# Patient Record
Sex: MALE | Race: WHITE | NOT HISPANIC OR LATINO | URBAN - METROPOLITAN AREA
[De-identification: names, ages, dates, MRNs, and addresses within clinical notes are randomized per-mention and may not be internally consistent; named-entity substitution may affect disease eponyms.]

---

## 2018-11-15 ENCOUNTER — APPOINTMENT (RX ONLY)
Dept: URBAN - METROPOLITAN AREA CLINIC 343 | Facility: CLINIC | Age: 83
Setting detail: DERMATOLOGY
End: 2018-11-15

## 2018-11-15 DIAGNOSIS — L82.1 OTHER SEBORRHEIC KERATOSIS: ICD-10-CM

## 2018-11-15 DIAGNOSIS — L82.0 INFLAMED SEBORRHEIC KERATOSIS: ICD-10-CM

## 2018-11-15 DIAGNOSIS — L57.0 ACTINIC KERATOSIS: ICD-10-CM

## 2018-11-15 DIAGNOSIS — L81.4 OTHER MELANIN HYPERPIGMENTATION: ICD-10-CM

## 2018-11-15 DIAGNOSIS — D18.0 HEMANGIOMA: ICD-10-CM

## 2018-11-15 DIAGNOSIS — D22 MELANOCYTIC NEVI: ICD-10-CM

## 2018-11-15 PROBLEM — M12.9 ARTHROPATHY, UNSPECIFIED: Status: ACTIVE | Noted: 2018-11-15

## 2018-11-15 PROBLEM — D22.39 MELANOCYTIC NEVI OF OTHER PARTS OF FACE: Status: ACTIVE | Noted: 2018-11-15

## 2018-11-15 PROBLEM — D22.4 MELANOCYTIC NEVI OF SCALP AND NECK: Status: ACTIVE | Noted: 2018-11-15

## 2018-11-15 PROBLEM — D18.01 HEMANGIOMA OF SKIN AND SUBCUTANEOUS TISSUE: Status: ACTIVE | Noted: 2018-11-15

## 2018-11-15 PROBLEM — D22.62 MELANOCYTIC NEVI OF LEFT UPPER LIMB, INCLUDING SHOULDER: Status: ACTIVE | Noted: 2018-11-15

## 2018-11-15 PROBLEM — I48.91 UNSPECIFIED ATRIAL FIBRILLATION: Status: ACTIVE | Noted: 2018-11-15

## 2018-11-15 PROBLEM — D22.5 MELANOCYTIC NEVI OF TRUNK: Status: ACTIVE | Noted: 2018-11-15

## 2018-11-15 PROBLEM — D22.61 MELANOCYTIC NEVI OF RIGHT UPPER LIMB, INCLUDING SHOULDER: Status: ACTIVE | Noted: 2018-11-15

## 2018-11-15 PROCEDURE — ? LIQUID NITROGEN

## 2018-11-15 PROCEDURE — 17110 DESTRUCTION B9 LES UP TO 14: CPT

## 2018-11-15 PROCEDURE — ? COUNSELING

## 2018-11-15 PROCEDURE — 17000 DESTRUCT PREMALG LESION: CPT | Mod: 59

## 2018-11-15 PROCEDURE — 99202 OFFICE O/P NEW SF 15 MIN: CPT | Mod: 25

## 2018-11-15 PROCEDURE — 17003 DESTRUCT PREMALG LES 2-14: CPT

## 2018-11-15 ASSESSMENT — LOCATION DETAILED DESCRIPTION DERM
LOCATION DETAILED: LEFT CENTRAL FRONTAL SCALP
LOCATION DETAILED: LEFT INFERIOR LATERAL FOREHEAD
LOCATION DETAILED: RIGHT PROXIMAL DORSAL FOREARM
LOCATION DETAILED: RIGHT SUPERIOR MEDIAL UPPER BACK
LOCATION DETAILED: RIGHT LATERAL MALAR CHEEK
LOCATION DETAILED: RIGHT MEDIAL UPPER BACK
LOCATION DETAILED: RIGHT MID-UPPER BACK
LOCATION DETAILED: LEFT INFERIOR FOREHEAD
LOCATION DETAILED: LEFT MID TEMPLE
LOCATION DETAILED: LEFT MEDIAL FRONTAL SCALP
LOCATION DETAILED: NASAL SUPRATIP
LOCATION DETAILED: LEFT INFERIOR CENTRAL MALAR CHEEK
LOCATION DETAILED: LEFT SUPERIOR PARIETAL SCALP
LOCATION DETAILED: LEFT PROXIMAL DORSAL FOREARM
LOCATION DETAILED: RIGHT DISTAL DORSAL FOREARM
LOCATION DETAILED: LEFT DISTAL DORSAL FOREARM
LOCATION DETAILED: LEFT CENTRAL MALAR CHEEK
LOCATION DETAILED: LEFT SUPERIOR MEDIAL FOREHEAD

## 2018-11-15 ASSESSMENT — LOCATION SIMPLE DESCRIPTION DERM
LOCATION SIMPLE: NOSE
LOCATION SIMPLE: SCALP
LOCATION SIMPLE: LEFT CHEEK
LOCATION SIMPLE: LEFT FOREHEAD
LOCATION SIMPLE: RIGHT CHEEK
LOCATION SIMPLE: RIGHT UPPER BACK
LOCATION SIMPLE: LEFT SCALP
LOCATION SIMPLE: LEFT FOREARM
LOCATION SIMPLE: RIGHT FOREARM
LOCATION SIMPLE: LEFT TEMPLE

## 2018-11-15 ASSESSMENT — LOCATION ZONE DERM
LOCATION ZONE: ARM
LOCATION ZONE: TRUNK
LOCATION ZONE: SCALP
LOCATION ZONE: NOSE
LOCATION ZONE: FACE

## 2018-12-12 ENCOUNTER — NEW PATIENT COMPREHENSIVE (OUTPATIENT)
Dept: URBAN - METROPOLITAN AREA CLINIC 43 | Facility: CLINIC | Age: 83
End: 2018-12-12

## 2018-12-12 DIAGNOSIS — H26.491: ICD-10-CM

## 2018-12-12 DIAGNOSIS — H43.813: ICD-10-CM

## 2018-12-12 DIAGNOSIS — H35.3130: ICD-10-CM

## 2018-12-12 DIAGNOSIS — Z96.1: ICD-10-CM

## 2018-12-12 PROCEDURE — G9903 PT SCRN TBCO ID AS NON USER: HCPCS

## 2018-12-12 PROCEDURE — 1036F TOBACCO NON-USER: CPT

## 2018-12-12 PROCEDURE — G8756 NO BP MEASURE DOC: HCPCS

## 2018-12-12 PROCEDURE — 92134 CPTRZ OPH DX IMG PST SGM RTA: CPT

## 2018-12-12 PROCEDURE — 2019F DILATED MACUL EXAM DONE: CPT

## 2018-12-12 PROCEDURE — 92015 DETERMINE REFRACTIVE STATE: CPT

## 2018-12-12 PROCEDURE — 92004 COMPRE OPH EXAM NEW PT 1/>: CPT

## 2018-12-12 PROCEDURE — 4177F TALK PT/CRGVR RE AREDS PREV: CPT

## 2018-12-12 PROCEDURE — G8427 DOCREV CUR MEDS BY ELIG CLIN: HCPCS

## 2018-12-12 ASSESSMENT — VISUAL ACUITY
OS_BAT: >20/400
OS_CC: 20/20-2
OD_SC: 20/40
OS_CC: J1
OD_SC: J12
OS_SC: J8
OD_CC: J3
OS_SC: 20/30+2
OD_CC: 20/40-2
OD_BAT: >20/400

## 2018-12-12 ASSESSMENT — TONOMETRY
OS_IOP_MMHG: 14
OD_IOP_MMHG: 17

## 2018-12-20 ENCOUNTER — ESTABLISHED PATIENT (OUTPATIENT)
Dept: URBAN - METROPOLITAN AREA CLINIC 39 | Facility: CLINIC | Age: 83
End: 2018-12-20

## 2018-12-20 ENCOUNTER — SURGERY/PROCEDURE (OUTPATIENT)
Dept: URBAN - METROPOLITAN AREA SURGERY 14 | Facility: SURGERY | Age: 83
End: 2018-12-20

## 2018-12-20 DIAGNOSIS — H26.491: ICD-10-CM

## 2018-12-20 PROCEDURE — G8756 NO BP MEASURE DOC: HCPCS

## 2018-12-20 PROCEDURE — 92014 COMPRE OPH EXAM EST PT 1/>: CPT

## 2018-12-20 PROCEDURE — G8427 DOCREV CUR MEDS BY ELIG CLIN: HCPCS

## 2018-12-20 PROCEDURE — 66821 AFTER CATARACT LASER SURGERY: CPT

## 2018-12-20 PROCEDURE — G9903 PT SCRN TBCO ID AS NON USER: HCPCS

## 2018-12-20 PROCEDURE — 1036F TOBACCO NON-USER: CPT

## 2018-12-20 ASSESSMENT — VISUAL ACUITY
OD_GLARE: 20/400
OD_SC: 20/40-1
OS_SC: 20/30

## 2018-12-20 ASSESSMENT — TONOMETRY
OS_IOP_MMHG: 14
OD_IOP_MMHG: 15

## 2018-12-27 ENCOUNTER — YAG POST-OP (OUTPATIENT)
Dept: URBAN - METROPOLITAN AREA CLINIC 43 | Facility: CLINIC | Age: 83
End: 2018-12-27

## 2018-12-27 DIAGNOSIS — Z98.890: ICD-10-CM

## 2018-12-27 PROCEDURE — 99024 POSTOP FOLLOW-UP VISIT: CPT

## 2018-12-27 ASSESSMENT — VISUAL ACUITY
OD_SC: 20/25+2
OS_SC: 20/30-2

## 2018-12-27 ASSESSMENT — TONOMETRY: OD_IOP_MMHG: 16

## 2019-12-27 ENCOUNTER — ESTABLISHED COMPREHENSIVE EXAM (OUTPATIENT)
Dept: URBAN - METROPOLITAN AREA CLINIC 43 | Facility: CLINIC | Age: 84
End: 2019-12-27

## 2019-12-27 DIAGNOSIS — H43.813: ICD-10-CM

## 2019-12-27 DIAGNOSIS — H35.3130: ICD-10-CM

## 2019-12-27 PROCEDURE — 92015 DETERMINE REFRACTIVE STATE: CPT

## 2019-12-27 PROCEDURE — 92014 COMPRE OPH EXAM EST PT 1/>: CPT

## 2019-12-27 ASSESSMENT — VISUAL ACUITY
OS_SC: J4
OD_CC: 20/20-2
OD_SC: 20/25+2
OD_SC: J2
OD_CC: J1
OS_SC: 20/30+1
OS_CC: 20/25+2
OS_CC: J1

## 2019-12-27 ASSESSMENT — TONOMETRY
OS_IOP_MMHG: 16
OD_IOP_MMHG: 16

## 2021-01-04 ENCOUNTER — ESTABLISHED COMPREHENSIVE EXAM (OUTPATIENT)
Dept: URBAN - METROPOLITAN AREA CLINIC 43 | Facility: CLINIC | Age: 86
End: 2021-01-04

## 2021-01-04 DIAGNOSIS — H43.813: ICD-10-CM

## 2021-01-04 DIAGNOSIS — H52.4: ICD-10-CM

## 2021-01-04 DIAGNOSIS — H35.3130: ICD-10-CM

## 2021-01-04 PROCEDURE — 92014 COMPRE OPH EXAM EST PT 1/>: CPT

## 2021-01-04 PROCEDURE — 92015 DETERMINE REFRACTIVE STATE: CPT

## 2021-01-04 ASSESSMENT — VISUAL ACUITY
OS_SC: 20/25-3
OD_CC: J1
OD_CC: 20/25
OS_CC: J1
OD_SC: 20/25
OS_CC: 20/20-3
OD_SC: J2
OS_SC: J6

## 2021-01-04 ASSESSMENT — TONOMETRY
OS_IOP_MMHG: 15
OD_IOP_MMHG: 15

## 2021-04-21 ENCOUNTER — EST. PATIENT EMERGENCY (OUTPATIENT)
Dept: URBAN - METROPOLITAN AREA CLINIC 43 | Facility: CLINIC | Age: 86
End: 2021-04-21

## 2021-04-21 DIAGNOSIS — H43.813: ICD-10-CM

## 2021-04-21 PROCEDURE — 92012 INTRM OPH EXAM EST PATIENT: CPT

## 2021-04-21 ASSESSMENT — TONOMETRY
OD_IOP_MMHG: 15
OS_IOP_MMHG: 15

## 2021-04-21 ASSESSMENT — VISUAL ACUITY
OS_SC: 20/30
OD_SC: 20/40-1

## 2021-04-23 ENCOUNTER — RETINA CONSULT (OUTPATIENT)
Dept: URBAN - METROPOLITAN AREA CLINIC 43 | Facility: CLINIC | Age: 86
End: 2021-04-23

## 2021-04-23 DIAGNOSIS — H43.813: ICD-10-CM

## 2021-04-23 DIAGNOSIS — H35.3131: ICD-10-CM

## 2021-04-23 DIAGNOSIS — H35.371: ICD-10-CM

## 2021-04-23 DIAGNOSIS — H43.11: ICD-10-CM

## 2021-04-23 PROCEDURE — 92134 CPTRZ OPH DX IMG PST SGM RTA: CPT

## 2021-04-23 PROCEDURE — 92235 FLUORESCEIN ANGRPH MLTIFRAME: CPT

## 2021-04-23 PROCEDURE — 99214 OFFICE O/P EST MOD 30 MIN: CPT

## 2021-04-23 PROCEDURE — 92202 OPSCPY EXTND ON/MAC DRAW: CPT

## 2021-04-23 ASSESSMENT — VISUAL ACUITY
OS_SC: 20/40
OD_SC: 20/25

## 2021-04-23 ASSESSMENT — TONOMETRY
OD_IOP_MMHG: 14
OS_IOP_MMHG: 14

## 2021-05-24 ENCOUNTER — ESTABLISHED PATIENT (OUTPATIENT)
Dept: URBAN - METROPOLITAN AREA CLINIC 43 | Facility: CLINIC | Age: 86
End: 2021-05-24

## 2021-05-24 DIAGNOSIS — H43.11: ICD-10-CM

## 2021-05-24 DIAGNOSIS — H35.371: ICD-10-CM

## 2021-05-24 DIAGNOSIS — H43.813: ICD-10-CM

## 2021-05-24 DIAGNOSIS — H35.3131: ICD-10-CM

## 2021-05-24 PROCEDURE — 92201 OPSCPY EXTND RTA DRAW UNI/BI: CPT

## 2021-05-24 PROCEDURE — 92134 CPTRZ OPH DX IMG PST SGM RTA: CPT

## 2021-05-24 PROCEDURE — 99213 OFFICE O/P EST LOW 20 MIN: CPT

## 2021-05-24 ASSESSMENT — VISUAL ACUITY
OS_SC: 20/40
OD_SC: 20/50-1

## 2021-05-24 ASSESSMENT — TONOMETRY: OD_IOP_MMHG: 17

## 2021-11-17 ENCOUNTER — ESTABLISHED COMPREHENSIVE EXAM (OUTPATIENT)
Dept: URBAN - METROPOLITAN AREA CLINIC 43 | Facility: CLINIC | Age: 86
End: 2021-11-17

## 2021-11-17 DIAGNOSIS — H35.3131: ICD-10-CM

## 2021-11-17 DIAGNOSIS — H43.813: ICD-10-CM

## 2021-11-17 DIAGNOSIS — H52.4: ICD-10-CM

## 2021-11-17 PROCEDURE — 92014 COMPRE OPH EXAM EST PT 1/>: CPT

## 2021-11-17 PROCEDURE — 92015 DETERMINE REFRACTIVE STATE: CPT

## 2021-11-17 ASSESSMENT — VISUAL ACUITY
OD_SC: >J12
OS_PH: 20/25-3
OS_SC: J4
OS_SC: 20/40

## 2021-11-17 ASSESSMENT — TONOMETRY
OS_IOP_MMHG: 15
OD_IOP_MMHG: 15

## 2021-12-06 ENCOUNTER — ESTABLISHED PATIENT (OUTPATIENT)
Dept: URBAN - METROPOLITAN AREA CLINIC 43 | Facility: CLINIC | Age: 86
End: 2021-12-06

## 2021-12-06 DIAGNOSIS — H43.11: ICD-10-CM

## 2021-12-06 DIAGNOSIS — H35.3131: ICD-10-CM

## 2021-12-06 DIAGNOSIS — H35.371: ICD-10-CM

## 2021-12-06 DIAGNOSIS — H35.363: ICD-10-CM

## 2021-12-06 DIAGNOSIS — H35.372: ICD-10-CM

## 2021-12-06 DIAGNOSIS — H35.723: ICD-10-CM

## 2021-12-06 PROCEDURE — 92235 FLUORESCEIN ANGRPH MLTIFRAME: CPT

## 2021-12-06 PROCEDURE — 92250 FUNDUS PHOTOGRAPHY W/I&R: CPT

## 2021-12-06 PROCEDURE — 99214 OFFICE O/P EST MOD 30 MIN: CPT

## 2021-12-06 RX ORDER — PREDNISOLONE ACETATE 10 MG/ML
1 SUSPENSION/ DROPS OPHTHALMIC
Start: 2021-12-10

## 2021-12-06 RX ORDER — OFLOXACIN 3 MG/ML
1 SOLUTION OPHTHALMIC
Start: 2021-12-10

## 2021-12-06 ASSESSMENT — VISUAL ACUITY
OS_SC: 20/50+2
OD_SC: 20/400

## 2021-12-06 ASSESSMENT — TONOMETRY
OS_IOP_MMHG: 14
OD_IOP_MMHG: 18

## 2021-12-09 ENCOUNTER — SURGERY/PROCEDURE (OUTPATIENT)
Dept: URBAN - METROPOLITAN AREA CLINIC 43 | Facility: CLINIC | Age: 86
End: 2021-12-09

## 2021-12-09 DIAGNOSIS — H53.9: ICD-10-CM

## 2021-12-09 DIAGNOSIS — H43.11: ICD-10-CM

## 2021-12-09 PROCEDURE — 67036 REMOVAL OF INNER EYE FLUID: CPT

## 2021-12-10 ENCOUNTER — POST-OP (OUTPATIENT)
Dept: URBAN - METROPOLITAN AREA CLINIC 43 | Facility: CLINIC | Age: 86
End: 2021-12-10

## 2021-12-10 DIAGNOSIS — H43.813: ICD-10-CM

## 2021-12-10 DIAGNOSIS — H43.11: ICD-10-CM

## 2021-12-10 PROCEDURE — 99024 POSTOP FOLLOW-UP VISIT: CPT

## 2021-12-10 ASSESSMENT — TONOMETRY
OD_IOP_MMHG: 26
OD_IOP_MMHG: 30

## 2021-12-13 ENCOUNTER — POST-OP (OUTPATIENT)
Dept: URBAN - METROPOLITAN AREA CLINIC 43 | Facility: CLINIC | Age: 86
End: 2021-12-13

## 2021-12-13 DIAGNOSIS — H43.11: ICD-10-CM

## 2021-12-13 PROCEDURE — 99024 POSTOP FOLLOW-UP VISIT: CPT

## 2021-12-13 ASSESSMENT — TONOMETRY
OS_IOP_MMHG: 12
OD_IOP_MMHG: 11

## 2021-12-13 ASSESSMENT — VISUAL ACUITY
OS_PH: 20/40+2
OS_SC: 20/50-1
OD_SC: 20/40

## 2021-12-17 ENCOUNTER — POST-OP (OUTPATIENT)
Dept: URBAN - METROPOLITAN AREA CLINIC 43 | Facility: CLINIC | Age: 86
End: 2021-12-17

## 2021-12-17 DIAGNOSIS — H43.11: ICD-10-CM

## 2021-12-17 DIAGNOSIS — H40.051: ICD-10-CM

## 2021-12-17 PROCEDURE — 99024 POSTOP FOLLOW-UP VISIT: CPT

## 2021-12-17 ASSESSMENT — VISUAL ACUITY: OD_SC: 20/400+1

## 2021-12-17 ASSESSMENT — TONOMETRY: OD_IOP_MMHG: 16

## 2022-01-05 ENCOUNTER — POST-OP (OUTPATIENT)
Dept: URBAN - METROPOLITAN AREA CLINIC 43 | Facility: CLINIC | Age: 87
End: 2022-01-05

## 2022-01-05 DIAGNOSIS — H53.9: ICD-10-CM

## 2022-01-05 DIAGNOSIS — H43.11: ICD-10-CM

## 2022-01-05 PROCEDURE — 99024 POSTOP FOLLOW-UP VISIT: CPT

## 2022-01-05 ASSESSMENT — TONOMETRY
OS_IOP_MMHG: 16
OD_IOP_MMHG: 19

## 2022-01-05 ASSESSMENT — VISUAL ACUITY: OD_SC: 20/400

## 2022-02-04 ENCOUNTER — POST-OP (OUTPATIENT)
Dept: URBAN - METROPOLITAN AREA CLINIC 43 | Facility: CLINIC | Age: 87
End: 2022-02-04

## 2022-02-04 DIAGNOSIS — H43.11: ICD-10-CM

## 2022-02-04 DIAGNOSIS — H53.9: ICD-10-CM

## 2022-02-04 PROCEDURE — 99024 POSTOP FOLLOW-UP VISIT: CPT

## 2022-02-04 PROCEDURE — 92134 CPTRZ OPH DX IMG PST SGM RTA: CPT

## 2022-02-04 ASSESSMENT — TONOMETRY: OD_IOP_MMHG: 19

## 2022-02-04 ASSESSMENT — VISUAL ACUITY: OD_SC: 20/70+2

## 2022-03-18 ENCOUNTER — ESTABLISHED PATIENT (OUTPATIENT)
Dept: URBAN - METROPOLITAN AREA CLINIC 43 | Facility: CLINIC | Age: 87
End: 2022-03-18

## 2022-03-18 DIAGNOSIS — H35.363: ICD-10-CM

## 2022-03-18 DIAGNOSIS — H43.813: ICD-10-CM

## 2022-03-18 DIAGNOSIS — H43.11: ICD-10-CM

## 2022-03-18 DIAGNOSIS — H35.3132: ICD-10-CM

## 2022-03-18 DIAGNOSIS — H35.723: ICD-10-CM

## 2022-03-18 DIAGNOSIS — H35.30: ICD-10-CM

## 2022-03-18 DIAGNOSIS — H40.051: ICD-10-CM

## 2022-03-18 DIAGNOSIS — H35.372: ICD-10-CM

## 2022-03-18 DIAGNOSIS — H35.371: ICD-10-CM

## 2022-03-18 PROCEDURE — 99214 OFFICE O/P EST MOD 30 MIN: CPT

## 2022-03-18 PROCEDURE — 92250 FUNDUS PHOTOGRAPHY W/I&R: CPT

## 2022-03-18 ASSESSMENT — VISUAL ACUITY
OS_SC: 20/30+1
OD_SC: 20/400

## 2022-03-18 ASSESSMENT — TONOMETRY
OS_IOP_MMHG: 13
OD_IOP_MMHG: 18

## 2022-03-21 ENCOUNTER — COMPREHENSIVE EXAM (OUTPATIENT)
Dept: URBAN - METROPOLITAN AREA CLINIC 43 | Facility: CLINIC | Age: 87
End: 2022-03-21

## 2022-03-21 DIAGNOSIS — H35.3132: ICD-10-CM

## 2022-03-21 DIAGNOSIS — H35.373: ICD-10-CM

## 2022-03-21 DIAGNOSIS — H52.4: ICD-10-CM

## 2022-03-21 DIAGNOSIS — H43.813: ICD-10-CM

## 2022-03-21 PROCEDURE — 92015 DETERMINE REFRACTIVE STATE: CPT

## 2022-03-21 PROCEDURE — 92014 COMPRE OPH EXAM EST PT 1/>: CPT

## 2022-03-21 ASSESSMENT — VISUAL ACUITY
OS_SC: J4
OS_CC: J1
OS_SC: 20/25-2
OS_CC: 20/30-1
OD_SC: 20/200 EF
OD_SC: >J12

## 2022-03-21 ASSESSMENT — TONOMETRY
OS_IOP_MMHG: 12
OD_IOP_MMHG: 20

## 2022-05-20 ENCOUNTER — COMPREHENSIVE EXAM (OUTPATIENT)
Dept: URBAN - METROPOLITAN AREA CLINIC 43 | Facility: CLINIC | Age: 87
End: 2022-05-20

## 2022-05-20 DIAGNOSIS — H35.3132: ICD-10-CM

## 2022-05-20 DIAGNOSIS — H35.373: ICD-10-CM

## 2022-05-20 DIAGNOSIS — H43.11: ICD-10-CM

## 2022-05-20 DIAGNOSIS — H43.813: ICD-10-CM

## 2022-05-20 DIAGNOSIS — H35.363: ICD-10-CM

## 2022-05-20 DIAGNOSIS — H35.723: ICD-10-CM

## 2022-05-20 PROCEDURE — 92250 FUNDUS PHOTOGRAPHY W/I&R: CPT

## 2022-05-20 PROCEDURE — 92242 FLUORESCEIN&ICG ANGIOGRAPHY: CPT

## 2022-05-20 PROCEDURE — 99213 OFFICE O/P EST LOW 20 MIN: CPT

## 2022-05-20 ASSESSMENT — TONOMETRY
OS_IOP_MMHG: 15
OD_IOP_MMHG: 19

## 2022-05-20 ASSESSMENT — VISUAL ACUITY
OS_CC: 20/25-1
OD_CC: 20/400

## 2022-12-02 ENCOUNTER — COMPREHENSIVE EXAM (OUTPATIENT)
Dept: URBAN - METROPOLITAN AREA CLINIC 43 | Facility: CLINIC | Age: 87
End: 2022-12-02

## 2022-12-02 PROCEDURE — 99214 OFFICE O/P EST MOD 30 MIN: CPT

## 2022-12-02 PROCEDURE — 67028 INJECTION EYE DRUG: CPT

## 2022-12-02 PROCEDURE — 92242 FLUORESCEIN&ICG ANGIOGRAPHY: CPT

## 2022-12-02 PROCEDURE — 92134 CPTRZ OPH DX IMG PST SGM RTA: CPT

## 2022-12-02 ASSESSMENT — TONOMETRY
OS_IOP_MMHG: 15
OD_IOP_MMHG: 16

## 2022-12-02 ASSESSMENT — VISUAL ACUITY
OS_SC: 20/30-2
OD_SC: CF 1FT

## 2023-01-06 ENCOUNTER — CLINIC PROCEDURE ONLY (OUTPATIENT)
Dept: URBAN - METROPOLITAN AREA CLINIC 43 | Facility: CLINIC | Age: 88
End: 2023-01-06

## 2023-01-06 DIAGNOSIS — H35.3211: ICD-10-CM

## 2023-01-06 DIAGNOSIS — H35.3122: ICD-10-CM

## 2023-01-06 PROCEDURE — 67028 INJECTION EYE DRUG: CPT

## 2023-01-06 PROCEDURE — 92250 FUNDUS PHOTOGRAPHY W/I&R: CPT

## 2023-01-06 ASSESSMENT — VISUAL ACUITY
OD_SC: CF 2FT
OS_SC: 20/30

## 2023-01-06 ASSESSMENT — TONOMETRY
OS_IOP_MMHG: 17
OD_IOP_MMHG: 18

## 2023-04-24 ENCOUNTER — ESTABLISHED PATIENT (OUTPATIENT)
Dept: URBAN - METROPOLITAN AREA CLINIC 43 | Facility: CLINIC | Age: 88
End: 2023-04-24

## 2023-04-24 DIAGNOSIS — H53.19: ICD-10-CM

## 2023-04-24 DIAGNOSIS — H35.3211: ICD-10-CM

## 2023-04-24 DIAGNOSIS — H35.373: ICD-10-CM

## 2023-04-24 DIAGNOSIS — H04.123: ICD-10-CM

## 2023-04-24 DIAGNOSIS — H35.722: ICD-10-CM

## 2023-04-24 DIAGNOSIS — H35.363: ICD-10-CM

## 2023-04-24 DIAGNOSIS — H35.3122: ICD-10-CM

## 2023-04-24 DIAGNOSIS — H35.30: ICD-10-CM

## 2023-04-24 DIAGNOSIS — H43.813: ICD-10-CM

## 2023-04-24 DIAGNOSIS — H43.11: ICD-10-CM

## 2023-04-24 DIAGNOSIS — H35.731: ICD-10-CM

## 2023-04-24 PROCEDURE — 99214 OFFICE O/P EST MOD 30 MIN: CPT

## 2023-04-24 PROCEDURE — 67028 INJECTION EYE DRUG: CPT

## 2023-04-24 PROCEDURE — J0178PRE EYLEA PREFILLED SYRINGE

## 2023-04-24 PROCEDURE — 92242 FLUORESCEIN&ICG ANGIOGRAPHY: CPT

## 2023-04-24 PROCEDURE — 92134 CPTRZ OPH DX IMG PST SGM RTA: CPT

## 2023-04-24 ASSESSMENT — TONOMETRY
OS_IOP_MMHG: 18
OD_IOP_MMHG: 10

## 2023-04-24 ASSESSMENT — VISUAL ACUITY
OD_SC: CF 1FT
OS_SC: 20/25-1

## 2023-11-20 ENCOUNTER — COMPREHENSIVE EXAM (OUTPATIENT)
Dept: URBAN - METROPOLITAN AREA CLINIC 43 | Facility: CLINIC | Age: 88
End: 2023-11-20

## 2023-11-20 DIAGNOSIS — H43.11: ICD-10-CM

## 2023-11-20 DIAGNOSIS — H35.722: ICD-10-CM

## 2023-11-20 DIAGNOSIS — H35.3122: ICD-10-CM

## 2023-11-20 DIAGNOSIS — H35.363: ICD-10-CM

## 2023-11-20 DIAGNOSIS — H04.123: ICD-10-CM

## 2023-11-20 DIAGNOSIS — H35.30: ICD-10-CM

## 2023-11-20 DIAGNOSIS — H35.373: ICD-10-CM

## 2023-11-20 DIAGNOSIS — H35.3211: ICD-10-CM

## 2023-11-20 DIAGNOSIS — H35.731: ICD-10-CM

## 2023-11-20 DIAGNOSIS — H43.813: ICD-10-CM

## 2023-11-20 PROCEDURE — 92134 CPTRZ OPH DX IMG PST SGM RTA: CPT

## 2023-11-20 PROCEDURE — 67028 INJECTION EYE DRUG: CPT

## 2023-11-20 PROCEDURE — 92242 FLUORESCEIN&ICG ANGIOGRAPHY: CPT

## 2023-11-20 PROCEDURE — 99214 OFFICE O/P EST MOD 30 MIN: CPT

## 2023-11-20 ASSESSMENT — TONOMETRY
OD_IOP_MMHG: 13
OS_IOP_MMHG: 14

## 2023-11-20 ASSESSMENT — VISUAL ACUITY
OS_SC: 20/30
OS_PH: 20/20-1

## 2023-12-22 ENCOUNTER — CLINIC PROCEDURE ONLY (OUTPATIENT)
Dept: URBAN - METROPOLITAN AREA CLINIC 43 | Facility: CLINIC | Age: 88
End: 2023-12-22

## 2023-12-22 DIAGNOSIS — H35.731: ICD-10-CM

## 2023-12-22 DIAGNOSIS — H35.3211: ICD-10-CM

## 2023-12-22 PROCEDURE — 92250 FUNDUS PHOTOGRAPHY W/I&R: CPT

## 2023-12-22 PROCEDURE — 67028 INJECTION EYE DRUG: CPT

## 2023-12-22 PROCEDURE — 92134 CPTRZ OPH DX IMG PST SGM RTA: CPT

## 2023-12-22 ASSESSMENT — TONOMETRY
OD_IOP_MMHG: 12
OS_IOP_MMHG: 9

## 2023-12-22 ASSESSMENT — VISUAL ACUITY: OS_SC: 20/25

## 2024-01-09 ENCOUNTER — COMPREHENSIVE EXAM (OUTPATIENT)
Dept: URBAN - METROPOLITAN AREA CLINIC 43 | Facility: CLINIC | Age: 89
End: 2024-01-09

## 2024-01-09 DIAGNOSIS — H35.3122: ICD-10-CM

## 2024-01-09 DIAGNOSIS — H04.123: ICD-10-CM

## 2024-01-09 DIAGNOSIS — H35.373: ICD-10-CM

## 2024-01-09 DIAGNOSIS — H40.051: ICD-10-CM

## 2024-01-09 DIAGNOSIS — H35.3211: ICD-10-CM

## 2024-01-09 DIAGNOSIS — H52.4: ICD-10-CM

## 2024-01-09 DIAGNOSIS — H43.813: ICD-10-CM

## 2024-01-09 PROCEDURE — 92012 INTRM OPH EXAM EST PATIENT: CPT

## 2024-01-09 PROCEDURE — 92015 DETERMINE REFRACTIVE STATE: CPT

## 2024-01-09 ASSESSMENT — VISUAL ACUITY
OD_CC: CF 1FT
OD_SC: CF 1FT
OU_SC: 20/50-1
OD_SC: >J12
OS_SC: J8
OU_CC: 20/20-2
OS_CC: 20/25-2
OS_SC: 20/50+1

## 2024-01-09 ASSESSMENT — TONOMETRY
OS_IOP_MMHG: 15
OD_IOP_MMHG: 15

## 2024-01-26 ENCOUNTER — ESTABLISHED PATIENT (OUTPATIENT)
Dept: URBAN - METROPOLITAN AREA CLINIC 43 | Facility: CLINIC | Age: 89
End: 2024-01-26

## 2024-01-26 DIAGNOSIS — H35.3211: ICD-10-CM

## 2024-01-26 DIAGNOSIS — H43.813: ICD-10-CM

## 2024-01-26 DIAGNOSIS — H35.373: ICD-10-CM

## 2024-01-26 DIAGNOSIS — H35.722: ICD-10-CM

## 2024-01-26 DIAGNOSIS — H04.123: ICD-10-CM

## 2024-01-26 DIAGNOSIS — H35.30: ICD-10-CM

## 2024-01-26 DIAGNOSIS — H35.731: ICD-10-CM

## 2024-01-26 DIAGNOSIS — H35.3122: ICD-10-CM

## 2024-01-26 PROCEDURE — 92250 FUNDUS PHOTOGRAPHY W/I&R: CPT

## 2024-01-26 PROCEDURE — 67028 INJECTION EYE DRUG: CPT

## 2024-01-26 PROCEDURE — 99213 OFFICE O/P EST LOW 20 MIN: CPT

## 2024-01-26 ASSESSMENT — VISUAL ACUITY: OS_SC: 20/30+1

## 2024-01-26 ASSESSMENT — TONOMETRY
OD_IOP_MMHG: 13
OS_IOP_MMHG: 10

## 2024-03-01 ENCOUNTER — ESTABLISHED PATIENT (OUTPATIENT)
Dept: URBAN - METROPOLITAN AREA CLINIC 43 | Facility: CLINIC | Age: 89
End: 2024-03-01

## 2024-03-01 DIAGNOSIS — H35.3211: ICD-10-CM

## 2024-03-01 PROCEDURE — 92134 CPTRZ OPH DX IMG PST SGM RTA: CPT

## 2024-03-01 PROCEDURE — 92242 FLUORESCEIN&ICG ANGIOGRAPHY: CPT

## 2024-03-01 PROCEDURE — 67028 INJECTION EYE DRUG: CPT

## 2024-03-01 ASSESSMENT — TONOMETRY
OD_IOP_MMHG: 12
OS_IOP_MMHG: 10

## 2024-03-01 ASSESSMENT — VISUAL ACUITY
OD_SC: CF 1FT
OS_SC: 20/30

## 2024-04-08 ENCOUNTER — CLINIC PROCEDURE ONLY (OUTPATIENT)
Dept: URBAN - METROPOLITAN AREA CLINIC 43 | Facility: CLINIC | Age: 89
End: 2024-04-08

## 2024-04-08 DIAGNOSIS — H35.731: ICD-10-CM

## 2024-04-08 DIAGNOSIS — H35.3211: ICD-10-CM

## 2024-04-08 PROCEDURE — 67028 INJECTION EYE DRUG: CPT

## 2024-04-08 PROCEDURE — 92250 FUNDUS PHOTOGRAPHY W/I&R: CPT

## 2024-04-08 ASSESSMENT — VISUAL ACUITY
OS_SC: 20/40+2
OD_SC: CF 1FT

## 2024-04-08 ASSESSMENT — TONOMETRY
OS_IOP_MMHG: 13
OD_IOP_MMHG: 16

## 2024-05-20 ENCOUNTER — ESTABLISHED PATIENT (OUTPATIENT)
Dept: URBAN - METROPOLITAN AREA CLINIC 43 | Facility: CLINIC | Age: 89
End: 2024-05-20

## 2024-05-20 DIAGNOSIS — H35.731: ICD-10-CM

## 2024-05-20 DIAGNOSIS — H43.813: ICD-10-CM

## 2024-05-20 DIAGNOSIS — H35.722: ICD-10-CM

## 2024-05-20 DIAGNOSIS — H35.373: ICD-10-CM

## 2024-05-20 DIAGNOSIS — H04.123: ICD-10-CM

## 2024-05-20 DIAGNOSIS — H35.09: ICD-10-CM

## 2024-05-20 DIAGNOSIS — H35.30: ICD-10-CM

## 2024-05-20 DIAGNOSIS — H35.3122: ICD-10-CM

## 2024-05-20 DIAGNOSIS — H35.3211: ICD-10-CM

## 2024-05-20 DIAGNOSIS — H35.363: ICD-10-CM

## 2024-05-20 PROCEDURE — 67028 INJECTION EYE DRUG: CPT

## 2024-05-20 PROCEDURE — 99213 OFFICE O/P EST LOW 20 MIN: CPT | Mod: 25

## 2024-05-20 PROCEDURE — 92250 FUNDUS PHOTOGRAPHY W/I&R: CPT

## 2024-05-20 ASSESSMENT — TONOMETRY
OD_IOP_MMHG: 13
OS_IOP_MMHG: 10

## 2024-05-20 ASSESSMENT — VISUAL ACUITY
OS_SC: 20/40-1
OD_SC: CF 1FT